# Patient Record
Sex: MALE | Race: WHITE
[De-identification: names, ages, dates, MRNs, and addresses within clinical notes are randomized per-mention and may not be internally consistent; named-entity substitution may affect disease eponyms.]

---

## 2019-06-18 ENCOUNTER — HOSPITAL ENCOUNTER (EMERGENCY)
Dept: HOSPITAL 72 - EMR | Age: 34
Discharge: HOME | End: 2019-06-18
Payer: COMMERCIAL

## 2019-06-18 VITALS — SYSTOLIC BLOOD PRESSURE: 120 MMHG | DIASTOLIC BLOOD PRESSURE: 71 MMHG

## 2019-06-18 VITALS — BODY MASS INDEX: 25.18 KG/M2 | WEIGHT: 170 LBS | HEIGHT: 69 IN

## 2019-06-18 DIAGNOSIS — Z88.0: ICD-10-CM
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
## 2019-06-18 DIAGNOSIS — Z88.8: ICD-10-CM

## 2019-06-18 DIAGNOSIS — T16.1XXA: Primary | ICD-10-CM
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
## 2019-06-18 PROCEDURE — 99282 EMERGENCY DEPT VISIT SF MDM: CPT

## 2019-06-18 NOTE — EMERGENCY ROOM REPORT
History of Present Illness


General


Chief Complaint:  Earache


Source:  Patient





Present Illness


HPI


The patient states that around 3 AM this morning he noticed ear pain in his 

right ear.  He states that the pain occurs intermittently.  He states he will 

have time in between episodes where his symptoms are gone.  He denies recent 

illness.  He denies sore throat.  He denies congestion.  He denies discharge 

from the ear.  He has no other complaints.


Allergies:  


Coded Allergies:  


     AMOXICILLIN (Verified  Allergy, Unknown, 6/18/19)


     CEFACLOR (Verified  Allergy, Unknown, 6/18/19)





Patient History


Past Medical History:  asthma - As a child


Social History:  Denies: smoking, alcohol use, drug use


Reviewed Nursing Documentation:  PMH: Agreed; PSxH: Agreed





Nursing Documentation-PMH


Past Medical History:  No Stated History





Review of Systems


All Other Systems:  negative except mentioned in HPI





Physical Exam





Vital Signs








  Date Time  Temp Pulse Resp B/P (MAP) Pulse Ox O2 Delivery O2 Flow Rate FiO2


 


6/18/19 07:44 98.1 78 18 121/74 (90) 98 Room Air  








Sp02 EP Interpretation:  reviewed, normal


General Appearance:  no apparent distress, alert, GCS 15, non-toxic


Head:  normocephalic, atraumatic


Eyes:  bilateral eye normal inspection, bilateral eye PERRL


ENT:  hearing grossly normal, normal pharynx, no angioedema, normal voice, 

other - Live bug located in R. ear canal


Neck:  normal inspection


Respiratory:  no respiratory distress, no retraction, no accessory muscle use, 

speaking full sentences


Rectal:  deferred


Musculoskeletal:  normal inspection, gait/station normal


Neurologic:  alert, oriented x3, speech normal


Psychiatric:  judgement/insight normal, memory normal, mood/affect normal, no 

suicidal/homicidal ideation


Skin:  normal color, no rash, warm/dry, well hydrated





Medical Decision Making


Diagnostic Impression:  


 Primary Impression:  


 Acute foreign body of right ear canal


 Additional Impression:  


 Insect in R. ear canal


ER Course


This patient had a small insect in his right ear canal.  When I flushed the ear 

canal with 1% lidocaine, the insect crawled out.  Repeat examination showed no 

further foreign body or insect in the ear canal.  There was some irritation and 

slight bleeding of the inferior aspect of the ear canal.  Tympanic membrane was 

normal.  As a precaution, and to prevent infection, I will place the patient on 

topical antibiotics.  Patient is given return precautions and follow-up 

instructions.





Last Vital Signs








  Date Time  Temp Pulse Resp B/P (MAP) Pulse Ox O2 Delivery O2 Flow Rate FiO2


 


6/18/19 07:52 98.1 72 16 120/71 99 Room Air  








Status:  improved


Disposition:  HOME, SELF-CARE


Condition:  Improved











Josy Cisneros DO Jun 18, 2019 07:57